# Patient Record
Sex: FEMALE | Race: WHITE | Employment: OTHER | URBAN - METROPOLITAN AREA
[De-identification: names, ages, dates, MRNs, and addresses within clinical notes are randomized per-mention and may not be internally consistent; named-entity substitution may affect disease eponyms.]

---

## 2017-03-06 NOTE — PROGRESS NOTES
Sunday Lucas : 1928 Age 80year old female patient is seen at [facility] for [address]. Chief complaint: Expresses happiness in seeing me (tho' doubtful she knows who I am). HPI   Seen  by DPM, nails debrided.     Past Medical History   Diagn EXPERIENCED ANY OF THE FOLLOWING EVENTS 3/5/2014   Comment Procedure: LEFT PHACOEMULSIFICATION OF CATARACT WITH INTRAOCULAR LENS IMPLANT [id number]; Surgeon:  Grey Chowdhury MD; Location: [address] [place] [address]   N/A 2/5/2016   Comment Procedure: CYSTOSCOPY; Take 1 tablet by mouth daily.  Disp: Rfl:   donepezil (ARICEPT) 10 MG Oral Tab TAKE 1 TABLET BY MOUTH EVERY DAY Disp: 30 Tab Rfl: 1   Digoxin 0.125 MG Oral Tab TAKE 1 TABLET BY MOUTH EVERY DAY Disp: 30 Tab Rfl: 5   Warfarin Sodium 1 MG Oral Tab TAKE 2 TABLE

## 2017-04-10 ENCOUNTER — SNF VISIT (OUTPATIENT)
Dept: INTERNAL MEDICINE CLINIC | Age: 82
End: 2017-04-10

## 2017-04-10 DIAGNOSIS — F03.90 DEMENTIA WITHOUT BEHAVIORAL DISTURBANCE, UNSPECIFIED DEMENTIA TYPE (HCC): ICD-10-CM

## 2017-04-10 DIAGNOSIS — I48.20 CHRONIC ATRIAL FIBRILLATION (HCC): Primary | ICD-10-CM

## 2017-04-10 NOTE — PROGRESS NOTES
Sandra Garvin : 1928 Age 80year old female patient is seen at [facility] for [address]. Chief complaint: denies c/o. HPI   No significant events.     Past Medical History   Diagnosis Date   • CANCER   • OSTEOPENIA   • ATRIAL FIBRILLATION   • HTN LEFT PHACOEMULSIFICATION OF CATARACT WITH INTRAOCULAR LENS IMPLANT [id number]; Surgeon:  Verónica Arevalo MD; Location: [address] [place] [address]   N/A 2/5/2016   Comment Procedure: CYSTOSCOPY; Surgeon: Sharmaine Hurley MD; Location: [address] [place] [address] MAIN OR     Family History Oral Tab TAKE 1 TABLET BY MOUTH EVERY DAY Disp: 30 Tab Rfl: 1   Digoxin 0.125 MG Oral Tab TAKE 1 TABLET BY MOUTH EVERY DAY Disp: 30 Tab Rfl: 5   Warfarin Sodium 1 MG Oral Tab TAKE 2 TABLETS ON MON, WED AND SAT AND 1 TABLETS ALL OTHER DAYS OR AS DIRECTED BY

## 2017-04-23 ENCOUNTER — TELEPHONE (OUTPATIENT)
Dept: INTERNAL MEDICINE CLINIC | Age: 82
End: 2017-04-23

## 2017-04-23 NOTE — TELEPHONE ENCOUNTER
[facility] to FA[facility] a list of pt.'s pulses over the past 30 days.     (DocL= needs cardizem with dig d/t ^HR?)

## 2017-04-24 NOTE — TELEPHONE ENCOUNTER
Heidi Maxwell 89 and spoke with Veronica Reyes and advised her of your instructions.  Gave fax # to the office  Marylou Sullivan verbalized understanding

## 2017-05-04 ENCOUNTER — SNF VISIT (OUTPATIENT)
Dept: INTERNAL MEDICINE CLINIC | Age: 82
End: 2017-05-04

## 2017-05-04 DIAGNOSIS — N39.0 ACUTE UTI: Primary | ICD-10-CM

## 2017-05-04 DIAGNOSIS — F51.01 PRIMARY INSOMNIA: ICD-10-CM

## 2017-05-04 DIAGNOSIS — R63.4 WEIGHT LOSS: ICD-10-CM

## 2017-05-04 DIAGNOSIS — F33.2 ENDOGENOUS DEPRESSION (HCC): ICD-10-CM

## 2017-05-04 NOTE — PROGRESS NOTES
Aurther Parents : 1928 Age 80year old female patient is seen at [facility] for [address]. Chief complaint:    Denies c/o. HPI   Had dysuria, U/A and C/S ordered by NP, antibiox. started; no further c/o charted.     Past Medical History   Diagnosis EXPERIENCED ANY OF THE FOLLOWING EVENTS 3/5/2014   Comment Procedure: LEFT PHACOEMULSIFICATION OF CATARACT WITH INTRAOCULAR LENS IMPLANT [id number]; Surgeon:  Mayi Wilkins MD; Location: [address] [place] [address]   N/A 2/5/2016   Comment Procedure: CYSTOSCOPY; Take 1 tablet by mouth daily.  Disp: Rfl:   donepezil (ARICEPT) 10 MG Oral Tab TAKE 1 TABLET BY MOUTH EVERY DAY Disp: 30 Tab Rfl: 1   Digoxin 0.125 MG Oral Tab TAKE 1 TABLET BY MOUTH EVERY DAY Disp: 30 Tab Rfl: 5   Warfarin Sodium 1 MG Oral Tab TAKE 2 TABLE

## 2017-06-12 NOTE — PROGRESS NOTES
Justine Koch : 1928 Age 80year old female patient is seen at [facility] for [address]. Chief complaint:   She mentions someone taking a dog by the tail; re-directed.      HPI   No new events    Past Medical History   Diagnosis Date   • CANCER   • FOLLOWING EVENTS 3/5/2014   Comment Procedure: LEFT PHACOEMULSIFICATION OF CATARACT WITH INTRAOCULAR LENS IMPLANT [id number]; Surgeon:  Ludin Rodriguez MD; Location: [address] [place] [address]   N/A 2/5/2016   Comment Procedure: CYSTOSCOPY; Surgeon: River Escobar daily. Disp: Rfl:   donepezil (ARICEPT) 10 MG Oral Tab TAKE 1 TABLET BY MOUTH EVERY DAY Disp: 30 Tab Rfl: 1   Digoxin 0.125 MG Oral Tab TAKE 1 TABLET BY MOUTH EVERY DAY Disp: 30 Tab Rfl: 5   Warfarin Sodium 1 MG Oral Tab TAKE 2 TABLETS ON MON, WED AND SAT

## 2017-07-31 NOTE — PROGRESS NOTES
Chrissy Mederos : 1928 Age 80year old female patient is seen at [facility] for [address]. Chief complaint:   Smiles and replies to my greeting approriately.     HPI   No new events     Past Medical History   Diagnosis Date   • CANCER   • OSTEOPENIA 3/5/2014   Comment Procedure: LEFT PHACOEMULSIFICATION OF CATARACT WITH INTRAOCULAR LENS IMPLANT [id number]; Surgeon:  Austin Ley MD; Location: [address] [place] [address]   N/A 2/5/2016   Comment Procedure: CYSTOSCOPY; Surgeon: Desiree Pa MD; Location:  donepezil (ARICEPT) 10 MG Oral Tab TAKE 1 TABLET BY MOUTH EVERY DAY Disp: 30 Tab Rfl: 1   Digoxin 0.125 MG Oral Tab TAKE 1 TABLET BY MOUTH EVERY DAY Disp: 30 Tab Rfl: 5   Warfarin Sodium 1 MG Oral Tab TAKE 2 TABLETS ON MON, WED AND SAT AND 1 TABLETS ALL

## 2017-09-25 NOTE — PROGRESS NOTES
Chrissy Minayaeder : 1928 Age 80year old female patient is seen at [facility] for [address]. Chief complaint:   Worried she doesn't have things she needs. HPI   More agitation per Nursing.     Past Medical History   Diagnosis Date   • CANCER   • OS EVENTS 3/5/2014   Comment Procedure: LEFT PHACOEMULSIFICATION OF CATARACT WITH INTRAOCULAR LENS IMPLANT [id number]; Surgeon:  María Elena Cardona MD; Location: [address] [place] [address]   N/A 2/5/2016   Comment Procedure: CYSTOSCOPY; Surgeon: Chetna Hernandez MD; Jasmina Donald

## 2017-11-21 NOTE — PROGRESS NOTES
Brenna Schaffer : 1928 Age 80year old female patient is seen at [facility] for [address]. Chief complaint:  No c/o.     HPI  Less agitation    Past Medical History  Diagnosis Date  • CANCER  • OSTEOPENIA  • ATRIAL FIBRILLATION  • HTN (hypertension)  • LENS IMPLANT [id number]; Surgeon:  Ludin Rodriguez MD; Location: [address] [place] [address]  N/A 2/5/2016  Comment Procedure: CYSTOSCOPY; Surgeon: River Escobar MD; Location: [facility] MAIN OR  Family History  Problem Relation Age of Onset  • Royden Man Son

## 2017-12-18 ENCOUNTER — SNF VISIT (OUTPATIENT)
Dept: INTERNAL MEDICINE CLINIC | Age: 82
End: 2017-12-18

## 2017-12-18 DIAGNOSIS — I48.91 ATRIAL FIBRILLATION, UNSPECIFIED TYPE (HCC): Primary | ICD-10-CM

## 2017-12-18 DIAGNOSIS — F02.81 DEMENTIA ASSOCIATED WITH OTHER UNDERLYING DISEASE WITH BEHAVIORAL DISTURBANCE (HCC): ICD-10-CM

## 2017-12-18 NOTE — PROGRESS NOTES
Note Text: Justine Koch : 1928 Age 80year old female patient is seen at [facility] for [address]. Chief complaint:   No c/o.    HPI   Less agitation   Past Medical History   Diagnosis Date   • CANCER   • OSTEOPENIA   • ATRIAL FIBRILLATION   • HTN (h her ability states a correct understanding of the above and states agreement with the plan.

## 2018-10-22 ENCOUNTER — DOCUMENTATION ONLY (OUTPATIENT)
Dept: INTERNAL MEDICINE CLINIC | Age: 83
End: 2018-10-22

## 2018-10-22 NOTE — PROGRESS NOTES
Ni Xavier : 1928 Age 80year old female patient is seen at [facility] for LTC.  Cite Ettataouer complaint:  Nonsensical phrasses.      HPI  Soc [facility] saw = couldn't do  BIMS. Her PHQ was challneging and not consistent.     O x 1.      Past Medical Hi controlled.   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 Tolerates dig. at ~1.0 as now.  HTN= controlled.   CHRONIC LUNG DISEASE= not progressive   CKD = stable   DEPRESSION= controlled.   INSOMNIA= stable   S/P TKA= clinda pre-DDS ordered.

## 2018-11-26 ENCOUNTER — DOCUMENTATION ONLY (OUTPATIENT)
Dept: INTERNAL MEDICINE CLINIC | Age: 83
End: 2018-11-26

## 2018-11-26 NOTE — PROGRESS NOTES
Shanti Reaves : 1928 Age 80year old female patient is seen at [facility] for LTC.  Cite Ettataouer complaint:    Nonsensical phrases.      HPI:    O x 1.     No agitation      Past Medical History   Diagnosis Date   • CANCER   • OSTEOPENIA   • ATRIAL FIBR DEFICIENCY DISEASE= controlled   AF= controlled. Tolerates dig. at ~1.0 as now.    HTN= will change rx from los-hctz to losartan.  [facility] LUNG DISEASE= not progressive   CKD = improved   DEPRESSION= controlled.   INSOMNIA= stable   S/P TKA= clinda pre-DDS

## 2019-01-14 ENCOUNTER — SNF VISIT (OUTPATIENT)
Dept: INTERNAL MEDICINE CLINIC | Age: 84
End: 2019-01-14

## 2019-01-14 DIAGNOSIS — Z91.89 1 GUN IN HOME: Primary | ICD-10-CM

## 2019-01-14 NOTE — PROGRESS NOTES
America Bartlett : 1928 Age 80year old female patient is seen at [facility] for [address] complaint:     Nonsensical phrases.      HPI:     = Soc S[facility] saw pt.; unable to do BIMS/PHQ    2018= DPM trimmed nails     No agitation      Past Q  mo. FBS. LEB=[id number],135     HYPOMAGNESEMIA= controlled.     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 Tolerates dig. at ~1.0 as now. BB and CCB changed by NP to allow being crushed fro ingestion.      HTN= losartan.    [address] LUNG DISEASE= n